# Patient Record
Sex: MALE | Race: ASIAN | NOT HISPANIC OR LATINO | Employment: FULL TIME | URBAN - METROPOLITAN AREA
[De-identification: names, ages, dates, MRNs, and addresses within clinical notes are randomized per-mention and may not be internally consistent; named-entity substitution may affect disease eponyms.]

---

## 2019-08-05 ENCOUNTER — TELEPHONE (OUTPATIENT)
Dept: SCHEDULING | Facility: IMAGING CENTER | Age: 24
End: 2019-08-05

## 2019-08-13 ENCOUNTER — OFFICE VISIT (OUTPATIENT)
Dept: MEDICAL GROUP | Facility: MEDICAL CENTER | Age: 24
End: 2019-08-13
Payer: COMMERCIAL

## 2019-08-13 VITALS
DIASTOLIC BLOOD PRESSURE: 78 MMHG | SYSTOLIC BLOOD PRESSURE: 110 MMHG | HEIGHT: 68 IN | HEART RATE: 68 BPM | BODY MASS INDEX: 22.55 KG/M2 | OXYGEN SATURATION: 99 % | TEMPERATURE: 98.6 F | WEIGHT: 148.8 LBS

## 2019-08-13 DIAGNOSIS — Z13.6 ENCOUNTER FOR LIPID SCREENING FOR CARDIOVASCULAR DISEASE: ICD-10-CM

## 2019-08-13 DIAGNOSIS — Z76.89 ENCOUNTER TO ESTABLISH CARE WITH NEW DOCTOR: ICD-10-CM

## 2019-08-13 DIAGNOSIS — Z13.1 DIABETES MELLITUS SCREENING: ICD-10-CM

## 2019-08-13 DIAGNOSIS — S83.8X2A ACUTE LATERAL MENISCAL INJURY OF LEFT KNEE, INITIAL ENCOUNTER: ICD-10-CM

## 2019-08-13 DIAGNOSIS — Z13.220 ENCOUNTER FOR LIPID SCREENING FOR CARDIOVASCULAR DISEASE: ICD-10-CM

## 2019-08-13 DIAGNOSIS — R53.83 OTHER FATIGUE: ICD-10-CM

## 2019-08-13 DIAGNOSIS — Z00.00 ANNUAL PHYSICAL EXAM: ICD-10-CM

## 2019-08-13 DIAGNOSIS — G44.89 OTHER HEADACHE SYNDROME: ICD-10-CM

## 2019-08-13 PROBLEM — G44.209 TENSION TYPE HEADACHE: Status: ACTIVE | Noted: 2019-08-13

## 2019-08-13 PROBLEM — M25.562 LEFT LATERAL KNEE PAIN: Status: ACTIVE | Noted: 2019-08-13

## 2019-08-13 PROCEDURE — 99204 OFFICE O/P NEW MOD 45 MIN: CPT | Performed by: FAMILY MEDICINE

## 2019-08-13 SDOH — HEALTH STABILITY: MENTAL HEALTH: HOW MANY STANDARD DRINKS CONTAINING ALCOHOL DO YOU HAVE ON A TYPICAL DAY?: 1 OR 2

## 2019-08-13 SDOH — HEALTH STABILITY: MENTAL HEALTH: HOW OFTEN DO YOU HAVE A DRINK CONTAINING ALCOHOL?: 2-3 TIMES A WEEK

## 2019-08-13 ASSESSMENT — PATIENT HEALTH QUESTIONNAIRE - PHQ9: CLINICAL INTERPRETATION OF PHQ2 SCORE: 0

## 2019-08-23 NOTE — ASSESSMENT & PLAN NOTE
New problem, uncontrolled, patient with bilateral posterior aching muscular pain in the neck muscles that can then shoot to his temples bilaterally.  This typically occurs on days that he has more stress.  Additionally, patient works as a  at his local [employer].    Patient did not have any trauma, falls, twisting or lifting motion injuries.    ROS negative for bilateral upper extremity radicular pain/numbness/weakness, bilateral hand  weakness, dropping things from his bilateral hands

## 2019-08-23 NOTE — PROGRESS NOTES
Subjective:   Chief Complaint/History of Present Illness:  Stanislav Cintron is a 23 y.o. male patient new to [facility] who presents today to establish primary medical care and to discuss the following medical conditions.      Diagnoses of Encounter to establish care with new doctor, Acute lateral meniscal injury of left knee, initial encounter, Other headache syndrome, Other fatigue, Annual physical exam, Diabetes mellitus screening, and Encounter for lipid screening for cardiovascular disease were pertinent to this visit.    PMH, PSH, Social History, Medications, Allergies, FMH all reviewed as documented:    Acute lateral meniscal injury of left knee  New problem, uncontrolled, patient works as a , and states that when he is standing for prolonged amount of time he tends to have left knee lateral pain that is worsened also with prolonged walking during the days that he stands on his feet more often.  Patient does not have pain of the knee Nor buckling of the left knee.  Does not have any blunt force trauma, and patient does not have any left lower extremity radicular pain/numbness nor any left foot drop.    Other headache syndrome  New problem, uncontrolled, patient with bilateral posterior aching muscular pain in the neck muscles that can then shoot to his temples bilaterally.  This typically occurs on days that he has more stress.  Additionally, patient works as a  at his local [employer].    Patient did not have any trauma, falls, twisting or lifting motion injuries.    ROS negative for bilateral upper extremity radicular pain/numbness/weakness, bilateral hand  weakness, dropping things from his bilateral hands    Other fatigue  New problem, uncontrolled, patient without any obvious signs or symptoms of blood loss anemia nor thyroid hormone dysfunction.  Please see ROS as below.  However, we will evaluate with labs.    ROS is NEGATIVE for generalized weakness/fatigue, generalized pallor,  dizziness, lightheadedness, blurred vision, chest pain/pressure, palpitations, tachycardia, dyspnea, hematemesis, hemoptysis, diarrhea, hematochezia, melena, hematuria, hand and foot tingling.    ROS is NEGATIVE for: cold or heat intolerance, anxiety/depression, chest pain/pressure, palpitations, hair thickening/coarsening/falling out/thinning, skin changes, diarrhea/constipation, unexpected weight change.      Patient Active Problem List    Diagnosis Date Noted   • Acute lateral meniscal injury of left knee 08/13/2019   • Other headache syndrome 08/13/2019   • Other fatigue 08/13/2019       Additional History:   Allergies:    Patient has no known allergies.     Medications:     No current [facility]-ordered outpatient medications on file.     No current [facility]-ordered facility-administered medications on file.         Past Medical History:   History reviewed. No pertinent past medical history.     Past Surgical History:   History reviewed. No pertinent surgical history.     Social History:     Social History     Tobacco Use   • Smoking status: Never Smoker   • Smokeless tobacco: Never Used   Substance Use Topics   • Alcohol use: Yes     Frequency: 2-3 times a week     Drinks per session: 1 or 2     Comment: Can drink more (5-6beers) @ family parties   • Drug use: Never        Family History:     Family Status   Relation Name Status   • Mo  (Not Specified)   • Fa  (Not Specified)   • PGMo  (Not Specified)        Family History   Problem Relation Age of Onset   • Hypertension Mother    • Hypertension Father    • Cancer Paternal Grandmother         Colon CA       ROS:     - Constitutional: Negative for fever, chills, unexpected weight change, and fatigue/generalized weakness.     - Respiratory: Negative for cough, sputum production, chest congestion, dyspnea, wheezing, and crackles.      - Cardiovascular: Negative for chest pain, palpitations, orthopnea, and bilateral lower extremity edema.     - NOTE: All other systems  "reviewed and are negative, except as in HPI.     Objective:   Physical Exam:    Vitals: /78 (BP Location: Left arm, Patient Position: Sitting)   Pulse 68   Temp 37 °C (98.6 °F) (Temporal)   Ht 1.727 m (5' 8\")   Wt 67.5 kg (148 lb 12.8 oz)   SpO2 99%    BMI: Body mass index is 22.62 kg/m².   General/Constitutional: Vitals as above, Well nourished, well developed male in no acute distress   Head/Eyes:  - Head is grossly normal & atraumatic  - Bilateral conjunctivae clear and not injected, bilateral EOMI, bilateral PERRL   ENT:   - Bilateral external ears grossly normal in appearance, external auditory canals clear & bilateral TMs visualized with appropriate cone of light reflex, hearing grossly intact  - External nares normal in appearance and without discharge/bleeding, bilateral turbinates non-erythematous/non-edematous and without discharge/bleeding  - Good dentition,  posterior oropharynx without erythema/edema/exudates  Neck: Neck supple, no masses, neck non-tender to palpation, no thyromegaly/goiter   Respiratory: No respiratory distress, bilateral lungs are clear to auscultation in all lung fields (anterior/lateral/posterior), no wheezing/rhonchi/rales   Cardiovascular: Regular rate and rhythm without murmur/gallops/rubs, carotid bruits , distal pulses equal and 2+ bilaterally (radial, posterior tibial), no bilateral lower extremity edema   MSK: bilateral trapezius muscle tenderness to palpation, positive exam for right knee tenderness to direct palpation of medial joint space,  including (Eri's, anterior/posterior drawer, patellar tendon tenderness to compression, quadriceps tendon tenderness to compression, tenderness to direct palpation of patella, tenderness to direct palpation of lateral joint spaces, tenderness to direct palpation of medial and lateral collateral ligaments, patellar mobility), Gait mildly antalgic, no tenderness to percussion of vertebral processes, no CVAT, no bilateral " SI joint tenderness   Integumentary: No apparent rashes   Neuro: Gross motor movement intact in all 4 extremities, Gross sensation intact to extremities and trunk, Gait grossly normal and not antalgic   Psych: Judgment grossly appropriate, no apparent depression/anxiety    Health Maintenance:     - Previous records were not requested at this visit.    Imaging/Labs:     - Previous records were not requested at this visit.    Assessment and Plan:   1. Encounter to establish care with new doctor  Patient establishing primary medical care with me    2. Acute lateral meniscal injury of left knee, initial encounter  New problem, uncontrolled, patient to have referral to [facility] ([place])    - REFERRAL TO PHYSICAL THERAPY Reason for Therapy: Eval/Treat/Report    3. Other headache syndrome  New problem, uncontrolled, likely tension type headache, however also evaluate for labs as below.   - TSH; Future   - TESTOSTERONE F&T MALE ADULT; Future    4. Other fatigue  New problem, uncontrolled, evaluate labs as below.   - HEMOGLOBIN A1C; Future   - CBC WITH DIFFERENTIAL; Future   - IRON/TOTAL IRON BIND; Future   - FERRITIN; Future   - FREE THYROXINE; Future   - TRIIDOTHYRONINE; Future   - TSH; Future   - TESTOSTERONE F&T MALE ADULT; Future   - VITAMIN B12; Future   - VITAMIN D,25 HYDROXY; Future    5. Annual physical exam  6. Diabetes mellitus screening  7. Encounter for lipid screening for cardiovascular disease  Labs ordered for annual medical exam   - HEMOGLOBIN A1C; Future   - Comp Metabolic Panel; Future   - CBC WITH DIFFERENTIAL; Future   - IRON/TOTAL IRON BIND; Future   - FERRITIN; Future   - Lipid Profile; Future   - FREE THYROXINE; Future   - TRIIDOTHYRONINE; Future   - TSH; Future   - TESTOSTERONE F&T MALE ADULT; Future   - VITAMIN B12; Future   - VITAMIN D,25 HYDROXY; Future          RTC: in 3months for Labs follow-up, R knee follow-up.    PLEASE NOTE: This dictation was created using voice recognition  software. I have made every reasonable attempt to correct obvious errors, but I expect that there are errors of grammar and possibly content that I did not discover before finalizing the note.

## 2019-08-23 NOTE — ASSESSMENT & PLAN NOTE
New problem, uncontrolled, patient works as a , and states that when he is standing for prolonged amount of time he tends to have left knee lateral pain that is worsened also with prolonged walking during the days that he stands on his feet more often.  Patient does not have pain of the knee Nor buckling of the left knee.  Does not have any blunt force trauma, and patient does not have any left lower extremity radicular pain/numbness nor any left foot drop.

## 2019-08-23 NOTE — ASSESSMENT & PLAN NOTE
New problem, uncontrolled, patient without any obvious signs or symptoms of blood loss anemia nor thyroid hormone dysfunction.  Please see ROS as below.  However, we will evaluate with labs.    ROS is NEGATIVE for generalized weakness/fatigue, generalized pallor, dizziness, lightheadedness, blurred vision, chest pain/pressure, palpitations, tachycardia, dyspnea, hematemesis, hemoptysis, diarrhea, hematochezia, melena, hematuria, hand and foot tingling.    ROS is NEGATIVE for: cold or heat intolerance, anxiety/depression, chest pain/pressure, palpitations, hair thickening/coarsening/falling out/thinning, skin changes, diarrhea/constipation, unexpected weight change.

## 2019-09-25 ENCOUNTER — APPOINTMENT (OUTPATIENT)
Dept: RADIOLOGY | Facility: IMAGING CENTER | Age: 24
End: 2019-09-25
Attending: NURSE PRACTITIONER
Payer: COMMERCIAL

## 2019-09-25 ENCOUNTER — OFFICE VISIT (OUTPATIENT)
Dept: URGENT CARE | Facility: CLINIC | Age: 24
End: 2019-09-25
Payer: COMMERCIAL

## 2019-09-25 VITALS
OXYGEN SATURATION: 98 % | SYSTOLIC BLOOD PRESSURE: 118 MMHG | BODY MASS INDEX: 22.58 KG/M2 | HEART RATE: 76 BPM | HEIGHT: 68 IN | TEMPERATURE: 98.2 F | RESPIRATION RATE: 16 BRPM | WEIGHT: 149 LBS | DIASTOLIC BLOOD PRESSURE: 66 MMHG

## 2019-09-25 DIAGNOSIS — S69.91XA INJURY OF FINGER OF RIGHT HAND, INITIAL ENCOUNTER: ICD-10-CM

## 2019-09-25 DIAGNOSIS — S60.031A CONTUSION OF RIGHT MIDDLE FINGER WITHOUT DAMAGE TO NAIL, INITIAL ENCOUNTER: ICD-10-CM

## 2019-09-25 PROCEDURE — 99214 OFFICE O/P EST MOD 30 MIN: CPT | Performed by: NURSE PRACTITIONER

## 2019-09-25 PROCEDURE — 73140 X-RAY EXAM OF FINGER(S): CPT | Mod: TC,RT | Performed by: NURSE PRACTITIONER

## 2019-09-26 NOTE — PROGRESS NOTES
Chief Complaint   Patient presents with   • Finger Pain     Swelling,middle R,hit on door yesterday.       HISTORY OF PRESENT ILLNESS: Patient is a 23 y.o. male who presents to urgent care today with complaints of right middle finger pain.  Patient notes that one month ago he accidentally injured his right third digit while playing basketball.  His finger was pulled back and jammed at that time.  He developed pain, swelling, and ecchymosis to the finger.  His symptoms have been mostly improved until yesterday when he accidentally hit this finger.  His pain and swelling have returned.  He is right-hand dominant.  He denies previous injuries to this finger.  He has tried ice as well as massage for treatment.    Patient Active Problem List    Diagnosis Date Noted   • Acute lateral meniscal injury of left knee 08/13/2019   • Other headache syndrome 08/13/2019   • Other fatigue 08/13/2019       Allergies:Patient has no known allergies.    No current [facility]-ordered outpatient medications on file.     No current [facility]-ordered facility-administered medications on file.        No past medical history on file.    Social History     Tobacco Use   • Smoking status: Never Smoker   • Smokeless tobacco: Never Used   Substance Use Topics   • Alcohol use: Yes     Frequency: 2-3 times a week     Drinks per session: 1 or 2     Comment: Can drink more (5-6beers) @ family parties   • Drug use: Never       Family Status   Relation Name Status   • Mo  (Not Specified)   • Fa  (Not Specified)   • PGMo  (Not Specified)     Family History   Problem Relation Age of Onset   • Hypertension Mother    • Hypertension Father    • Cancer Paternal Grandmother         Colon CA       ROS:  Review of Systems   Constitutional: Negative for fever, chills, weight loss, malaise, and fatigue.   HENT: Negative for ear pain, nosebleeds, congestion, sore throat and neck pain.    Eyes: Negative for vision changes.   Neuro: Negative for headache, sensory changes,  "weakness, seizure, LOC.   Cardiovascular: Negative for chest pain, palpitations, orthopnea and leg swelling.   Respiratory: Negative for cough, sputum production, shortness of breath and wheezing.   Gastrointestinal: Negative for abdominal pain, nausea, vomiting or diarrhea.   Genitourinary: Negative for dysuria, urgency and frequency.  Musculoskeletal: Positive for finger injury.  Negative for falls, neck pain, back pain, myalgias.   Skin: Negative for rash, diaphoresis.     Exam:  /66 (BP Location: Left arm, Patient Position: Sitting)   Pulse 76   Temp 36.8 °C (98.2 °F) (Temporal)   Resp 16   Ht 1.727 m (5' 8\")   Wt 67.6 kg (149 lb)   SpO2 98%   General: well-nourished, well-developed male in NAD  Head: normocephalic, atraumatic  Eyes: PERRLA, no conjunctival injection, acuity grossly intact, lids normal.  Ears: normal shape and symmetry, no tenderness, no discharge. External canals are without any significant edema or erythema. Gross auditory acuity is intact.  Nose: symmetrical without tenderness, no discharge.  Mouth/Throat: reasonable hygiene, no erythema, exudates or tonsillar enlargement.  Neck: no masses, range of motion within normal limits, no tracheal deviation. No obvious thyroid enlargement.   Lymph: no cervical adenopathy. No supraclavicular adenopathy.   Neuro: alert and oriented. Cranial nerves 1-12 grossly intact. No sensory deficit.   Cardiovascular: regular rate and rhythm. No edema.  Pulmonary: no distress. Chest is symmetrical with respiration, no wheezes, crackles, or rhonchi.   Musculoskeletal: no clubbing, appropriate muscle tone, gait is stable.  Right third digit: There is generalized swelling and tenderness over PIP.  Finger has full range of motion, distal neurovascular is intact, cap refill is brisk.  Skin: warm, dry, intact, no clubbing, no cyanosis, no rashes.   Psych: appropriate mood, affect, judgement.         Assessment/Plan:  1. Contusion of right middle finger " "without damage to nail, initial encounter  DX-FINGER(S) 2+ RIGHT         DX finger reviewed by myself, radiology reading \"Marked soft tissue swelling PIP joint right third finger. No acute fracture identified.\"      X-ray negative, suspect contusion.  Patient placed in aluminum splint.  RICE.  OTC NSAIDs.  Supportive care, differential diagnoses, and indications for immediate follow-up discussed with patient.   Pathogenesis of diagnosis discussed including typical length and natural progression.   Instructed to return to clinic or nearest emergency department for any change in condition, further concerns, or worsening of symptoms.  Patient states understanding of the plan of care and discharge instructions.  Instructed to make an appointment, for follow up, with his primary care provider.        Please note that this dictation was created using voice recognition software. I have made every reasonable attempt to correct obvious errors, but I expect that there are errors of grammar and possibly content that I did not discover before finalizing the note.      ANICETO Henriquez.     "